# Patient Record
Sex: MALE | Race: OTHER | NOT HISPANIC OR LATINO | URBAN - METROPOLITAN AREA
[De-identification: names, ages, dates, MRNs, and addresses within clinical notes are randomized per-mention and may not be internally consistent; named-entity substitution may affect disease eponyms.]

---

## 2018-10-11 ENCOUNTER — EMERGENCY (EMERGENCY)
Facility: HOSPITAL | Age: 38
LOS: 1 days | Discharge: HOME | End: 2018-10-11
Attending: EMERGENCY MEDICINE
Payer: COMMERCIAL

## 2018-10-11 VITALS
SYSTOLIC BLOOD PRESSURE: 131 MMHG | HEART RATE: 67 BPM | OXYGEN SATURATION: 98 % | RESPIRATION RATE: 18 BRPM | DIASTOLIC BLOOD PRESSURE: 83 MMHG | TEMPERATURE: 99 F

## 2018-10-11 VITALS — HEIGHT: 68 IN | WEIGHT: 179.9 LBS

## 2018-10-11 PROCEDURE — 99282 EMERGENCY DEPT VISIT SF MDM: CPT

## 2018-10-11 NOTE — ED PROVIDER NOTE - OBJECTIVE STATEMENT
Pt is a 38y M with no PMH complaining of pain and scratching sensation in R ear x 4 days. Pt thinks there is a bug in his ear. He reports feeling symptoms on and off with mild pain. He denies any other complaints. He denies any fever/nausea/vomiting/diarrhea. NKDA. Pt in no acute distress.

## 2018-10-11 NOTE — ED PROVIDER NOTE - ATTENDING CONTRIBUTION TO CARE
I, Joellen Nevarez, independently evaluated the patient. The PA made the initial evaluation and discussed history, physical and plan with me and I agree. I examined the patient noting bug in right ear which is not moving, and discussed need for removal. I discussed indications to return to the ED and the importance of proper follow up with PMD. Patient verbalizes understanding and is comfortable with discharge at this time.

## 2018-10-11 NOTE — ED PROVIDER NOTE - CHPI ED SYMPTOMS NEG
no loss of consciousness/no nausea/no numbness/no weakness/no blurred vision/no vomiting/no fever/no syncope/no change in level of consciousness/no chills

## 2018-10-11 NOTE — ED PROCEDURE NOTE - ATTENDING CONTRIBUTION TO CARE
I, Joellen Nevarez, independently evaluated the patient and discussed the procedure with the PA. I evaluted the patient prior to and after the procedure and the patient tolerated the procedure well. I discussed indications to return to the ED and the importance of proper follow up and patient verbalizes understanding.

## 2018-10-29 ENCOUNTER — EMERGENCY (EMERGENCY)
Facility: HOSPITAL | Age: 38
LOS: 1 days | Discharge: HOME | End: 2018-10-29
Attending: EMERGENCY MEDICINE
Payer: COMMERCIAL

## 2018-10-29 VITALS — WEIGHT: 190.04 LBS | HEIGHT: 72 IN

## 2018-10-29 VITALS
OXYGEN SATURATION: 97 % | TEMPERATURE: 98 F | SYSTOLIC BLOOD PRESSURE: 137 MMHG | DIASTOLIC BLOOD PRESSURE: 68 MMHG | HEART RATE: 76 BPM | RESPIRATION RATE: 18 BRPM

## 2018-10-29 PROCEDURE — 99283 EMERGENCY DEPT VISIT LOW MDM: CPT

## 2018-10-29 RX ORDER — NEOMYCIN/POLYMYXIN B/HYDROCORT
3 SUSPENSION, DROPS(FINAL DOSAGE FORM)(ML) OTIC (EAR) ONCE
Qty: 0 | Refills: 0 | Status: COMPLETED | OUTPATIENT
Start: 2018-10-29 | End: 2018-10-29

## 2018-10-29 RX ADMIN — Medication 3 DROP(S): at 19:31

## 2018-10-29 NOTE — ED PROVIDER NOTE - ENMT, MLM
Airway patent,  Mouth with normal mucosa. TMS NL LR X 2 R CANAL MARKED ERYTHEMA ( ? FROM Q TIPS) NO FB X 2 NO DRAINAGE OR SWELLING

## 2018-10-29 NOTE — ED PROVIDER NOTE - OBJECTIVE STATEMENT
37 YO WITH EAR PAIN  PT HAD A BUG IN HIS EAR A FEW WEEKS AGO AND IT WAS REMOVED HERE. HE DID NOT FU. HE STATES HE STILL FEELS SOMETHING IN HIS EAR. NO FEVER NO DRAINAGE NO CHANGE IN HEARING  No pertinent past medical history

## 2023-05-31 NOTE — ED PROVIDER NOTE - TIMING
[FreeTextEntry1] : symptomatic fever control, tepid bath, Tylenol prn, increased fluids, recheck if sx persist, back to  when afebrile >24 hours\par  gradual onset